# Patient Record
Sex: FEMALE | Race: BLACK OR AFRICAN AMERICAN | URBAN - METROPOLITAN AREA
[De-identification: names, ages, dates, MRNs, and addresses within clinical notes are randomized per-mention and may not be internally consistent; named-entity substitution may affect disease eponyms.]

---

## 2017-03-23 ENCOUNTER — OFFICE VISIT (OUTPATIENT)
Dept: SURGERY | Age: 52
End: 2017-03-23

## 2017-03-23 VITALS
SYSTOLIC BLOOD PRESSURE: 134 MMHG | HEART RATE: 96 BPM | TEMPERATURE: 99 F | RESPIRATION RATE: 16 BRPM | BODY MASS INDEX: 33.31 KG/M2 | OXYGEN SATURATION: 96 % | HEIGHT: 63 IN | DIASTOLIC BLOOD PRESSURE: 88 MMHG | WEIGHT: 188 LBS

## 2017-03-23 DIAGNOSIS — K64.2 THIRD DEGREE HEMORRHOIDS: ICD-10-CM

## 2017-03-23 DIAGNOSIS — K64.5 EXTERNAL HEMORRHOID, THROMBOSED: Primary | ICD-10-CM

## 2017-03-23 RX ORDER — ALBUTEROL SULFATE 90 UG/1
AEROSOL, METERED RESPIRATORY (INHALATION)
COMMUNITY

## 2017-03-23 RX ORDER — HYDROCODONE POLISTIREX AND CHLORPHENIRAMINE POLISTIREX 10; 8 MG/5ML; MG/5ML
SUSPENSION, EXTENDED RELEASE ORAL
COMMUNITY
End: 2017-03-23

## 2017-03-23 RX ORDER — METRONIDAZOLE 500 MG/1
TABLET ORAL 3 TIMES DAILY
COMMUNITY
End: 2017-03-23

## 2017-03-23 RX ORDER — LORAZEPAM 0.5 MG/1
TABLET ORAL
COMMUNITY

## 2017-03-23 RX ORDER — DOCUSATE SODIUM 100 MG/1
100 CAPSULE, LIQUID FILLED ORAL 2 TIMES DAILY
COMMUNITY

## 2017-03-23 RX ORDER — TAMOXIFEN CITRATE 20 MG/1
20 TABLET ORAL DAILY
COMMUNITY

## 2017-03-23 RX ORDER — RABEPRAZOLE SODIUM 20 MG/1
20 TABLET, DELAYED RELEASE ORAL DAILY
COMMUNITY

## 2017-03-23 RX ORDER — BENZONATATE 100 MG/1
100 CAPSULE ORAL
COMMUNITY
End: 2017-03-23

## 2017-03-23 RX ORDER — CLOBETASOL PROPIONATE 0.5 MG/G
CREAM TOPICAL 2 TIMES DAILY
COMMUNITY
End: 2017-03-23

## 2017-03-23 RX ORDER — VENLAFAXINE HYDROCHLORIDE 150 MG/1
TABLET, EXTENDED RELEASE ORAL
COMMUNITY

## 2017-03-23 RX ORDER — FERROUS SULFATE, DRIED 160(50) MG
1 TABLET, EXTENDED RELEASE ORAL 2 TIMES DAILY WITH MEALS
COMMUNITY

## 2017-03-23 RX ORDER — TERCONAZOLE 4 MG/G
1 CREAM VAGINAL
COMMUNITY
End: 2017-03-23

## 2017-03-23 RX ORDER — HYDROCHLOROTHIAZIDE 12.5 MG/1
12.5 CAPSULE ORAL DAILY
COMMUNITY

## 2017-03-23 RX ORDER — TERCONAZOLE 8 MG/G
1 CREAM VAGINAL
COMMUNITY
End: 2017-03-23

## 2017-03-23 NOTE — LETTER
3/23/2017 2:11 PM 
 
Patient: Alexus Grigsby YOB: 1965 Date of Visit: 3/23/2017 Janett Marquez MD 
[address] [place] [address] Dr Wright [address] [place] [id number] VIA Facsimile: [phone] Dear Alma Villa, I saw Yoel Laguerre in the office today for her anorectal pain. On exam she has a resolving right anterior quadrant thrombosed external hemorrhoid. She says at baseline she does not have significant hemorrhoidal symptoms. I recommended stool softener and sitz baths until the pain is fully resolved. I do not feel that excision of the hemorrhoid will benefit her at this point. She will follow-up if she does not have resolution of her symptoms. Thank you very much for your referral of Ms. Alexus Grigsby. If you have questions, please do not hesitate to call me. I look forward to following Ms. Ann along with you and will keep you updated as to her progress. Sincerely, Pawan Craft MD

## 2017-03-23 NOTE — PROGRESS NOTES
HPI: Benoit Wang is a 46 y.o. female presenting with chief complain of hemorrhoids. One week ago she had very painful hemorrhoid. She felt a firm mass. She has been using preparation H wipes, cream and suppository. At that point the pain was 10 out of 10 now it is 5. The pain is not constant but intermittent. She does have pain with bowel movements. She denies nausea vomiting bloating or abdominal pain. She has 2 bowel movements per day, and more recently has had some constipation. Her last colonoscopy was in 2016 and negative. She denies fecal incontinence. Past Medical History:   Diagnosis Date    Anxiety     Asthma     Breast cancer ([facility] [address]     Bronchitis     Carpal tunnel syndrome     Cough     GERD (gastroesophageal reflux disease)     Hypertension     Low back pain     Sinusitis     Vaginal yeast infection     Wheezing        Past Surgical History:   Procedure Laterality Date    HX APPENDECTOMY      HX BREAST RECONSTRUCTION      HX COLONOSCOPY  01/2016    normal    HX MASTECTOMY      HX OOPHORECTOMY      right       Family History   Problem Relation Age of Onset    Cancer Paternal Grandmother      stomach       Social History     Social History    Marital status: UNKNOWN     Spouse name: N/A    Number of children: N/A    Years of education: N/A     Social History Main Topics    Smoking status: Never Smoker    Smokeless tobacco: None    Alcohol use Yes    Drug use: None    Sexual activity: Not Asked     Other Topics Concern    None     Social History Narrative    None       Review of Systems - Review of Systems   Constitutional: Positive for chills, diaphoresis and fever. Negative for malaise/fatigue and weight loss. HENT: Negative. Eyes: Negative. Respiratory: Positive for cough, sputum production, shortness of breath and wheezing. Negative for hemoptysis. Cardiovascular: Negative. Gastrointestinal: Positive for constipation and nausea.  Negative for abdominal pain, blood in stool, diarrhea, heartburn, melena and vomiting. Genitourinary: Negative. Musculoskeletal: Positive for joint pain. Negative for back pain, falls, myalgias and neck pain. Knees   Skin: Negative. Neurological: Negative. Negative for weakness. Endo/Heme/Allergies: Negative for environmental allergies and polydipsia. Bruises/bleeds easily. Psychiatric/Behavioral: Negative. Outpatient Prescriptions Marked as Taking for the 3/23/17 encounter (Office Visit) with Mickey Sky MD   Medication Sig Dispense Refill    terconazole (TERAZOL 7) 0.4 % vaginal cream Insert 1 Applicator into vagina nightly.  Venlafaxine 150 mg tr24 Take  by mouth.  LORazepam (ATIVAN) 0.5 mg tablet Take  by mouth.  hydroCHLOROthiazide (MICROZIDE) 12.5 mg capsule Take 12.5 mg by mouth daily.  RABEprazole (ACIPHEX) 20 mg tablet Take 20 mg by mouth daily.  chlorpheniramine-HYDROcodone (TUSSIONEX PENNKINETIC ER) 10-8 mg/5 mL suspension Take  by mouth every twelve (12) hours as needed for Cough.  terconazole (TERAZOL 3) 0.8 % vaginal cream Insert 1 Applicator into vagina nightly.  benzonatate (TESSALON PERLES) 100 mg capsule Take 100 mg by mouth three (3) times daily as needed for Cough.  tamoxifen (NOLVADEX) 20 mg tablet Take 20 mg by mouth daily.  albuterol (PROAIR HFA) 90 mcg/actuation inhaler Take  by inhalation.  metroNIDAZOLE (FLAGYL) 500 mg tablet Take  by mouth three (3) times daily.  clobetasol (TEMOVATE) 0.05 % topical cream Apply  to affected area two (2) times a day.  docusate sodium (COLACE) 100 mg capsule Take 100 mg by mouth two (2) times a day.          Allergies   Allergen Reactions    Pcn [Penicillins] Rash    Sulfa (Sulfonamide Antibiotics) Rash       Vitals:    03/23/17 1319   BP: 134/88   Pulse: 96   Resp: 16   Temp: 99 °F (37.2 °C)   TempSrc: Oral   SpO2: 96%   Weight: 85.3 kg (188 lb)   Height: 5' 3\" (1.6 m) PainSc:   0 - No pain   LMP: 03/23/2012       Physical Exam   Constitutional: She appears well-developed and well-nourished. HENT:   Head: Normocephalic and atraumatic. Eyes: Conjunctivae and EOM are normal.   Abdominal: Soft. She exhibits no distension. There is no tenderness. Musculoskeletal: Normal range of motion. Lymphadenopathy:     She has no cervical adenopathy. Right: No inguinal adenopathy present. Left: No inguinal adenopathy present. Neurological: She exhibits normal muscle tone. Skin: Skin is warm and dry. No rash noted. Psychiatric: She has a normal mood and affect. Her speech is normal.    rectum: Right anterior quadrant partly thrombosed external hemorrhoid, slightly tender on exam  Right anterior and posterior hemorrhoidal prolapse  Digital rectal exam: Good tone, no masses  Anoscopy: Mild internal hemorrhoidal disease left lateral and right anterior quadrants    Assessment / Plan    Resolving thrombosed external hemorrhoid  Stool softeners and sitz baths for comfort, this will likely resolve on its own    Grade 3 hemorrhoids  Recommend high-fiber diet, fiber supplement with Metamucil or Citrucel  Patient states that aside from this episode she has no significant hemorrhoidal symptoms but if she does she can return to the office for consideration of further therapy        The diagnoses and plan were discussed with the patient. All questions answered. Plan of care agreed to by all concerned.

## 2017-03-23 NOTE — MR AVS SNAPSHOT
Visit Information Date & Time Provider Department Dept. Phone Encounter #  
 3/23/2017  1:15 PM Naveed Bonner  [address] [id number] Follow-up Instructions Return if symptoms worsen or fail to improve. Follow-up and Disposition History Upcoming Health Maintenance Date Due Hepatitis C Screening 1965 DTaP/Tdap/Td series (1 - Tdap) 11/21/1986 PAP AKA CERVICAL CYTOLOGY 11/21/1986 BREAST CANCER SCRN MAMMOGRAM 11/21/2015 FOBT Q 1 YEAR AGE 50-75 11/21/2015 INFLUENZA AGE 9 TO ADULT 8/1/2016 Allergies as of 3/23/2017  Review Complete On: 3/23/2017 By: Naveed Bonner MD  
  
 Severity Noted Reaction Type Reactions Pcn [Penicillins]  03/23/2017    Rash  
 Sulfa (Sulfonamide Antibiotics)  03/23/2017    Rash Current Immunizations  Never Reviewed No immunizations on file. Not reviewed this visit You Were Diagnosed With   
  
 Codes Comments External hemorrhoid, thrombosed    -  Primary ICD-10-CM: I17.0 ICD-9-CM: 455.4 Third degree hemorrhoids     ICD-10-CM: K64.2 ICD-9-CM: 455.8 Vitals BP Pulse Temp Resp Height(growth percentile) Weight(growth percentile) 134/88 (BP 1 Location: Left arm, BP Patient Position: Sitting) 96 99 °F (37.2 °C) (Oral) 16 5' 3\" (1.6 m) 188 lb (85.3 kg) LMP SpO2 BMI OB Status Smoking Status 03/23/2012 96% 33.3 kg/m2 Menopause Never Smoker Vitals History BMI and BSA Data Body Mass Index Body Surface Area  
 33.3 kg/m 2 1.95 m 2 Your Updated Medication List  
  
   
This list is accurate as of: 3/23/17  2:17 PM.  Always use your most recent med list.  
  
  
  
  
 ACIDOPHILUS Cap Generic drug:  Lactobacillus acidophilus Take 2 Caps by mouth two (2) times a day. calcium-vitamin D 500 mg(1,250mg) -200 unit per tablet Commonly known as:  OYSTER SHELL Take 1 Tab by mouth two (2) times daily (with meals). COLACE 100 mg capsule Generic drug:  docusate sodium Take 100 mg by mouth two (2) times a day. hydroCHLOROthiazide 12.5 mg capsule Commonly known as:  Ferdie Cea Take 12.5 mg by mouth daily. LORazepam 0.5 mg tablet Commonly known as:  ATIVAN Take  by mouth. ONE-A-DAY WOMEN VITACRAVES PO Take  by mouth. PROAIR HFA 90 mcg/actuation inhaler Generic drug:  albuterol Take  by inhalation. RABEprazole 20 mg tablet Commonly known as:  ACIPHEX Take 20 mg by mouth daily. tamoxifen 20 mg tablet Commonly known as:  NOLVADEX Take 20 mg by mouth daily. Venlafaxine 150 mg Tr24 Take  by mouth. We Performed the Following IN DIAGNOSTIC ANOSCOPY [id number] CPT(R)] Follow-up Instructions Return if symptoms worsen or fail to improve. Introducing [facility]! Reynaldo Hale introduces [employer] patient portal. Now you can access parts of your medical record, email your doctor's office, and request medication refills online. 1. In your internet browser, go to [email or web address] ([email or web address]). [email or web address] ([employer]) 2. Click on the First Time User? Click Here link in the Sign In box. You will see the New Member Sign Up page. 3. Enter your [employer] Access Code exactly as it appears below. You will not need to use this code after youve completed the sign-up process. If you do not sign up before the expiration date, you must request a new code. · [employer] Access Code: [email or web address] Expires: 6/21/2017  1:07 PM 
 
4. Enter the last four digits of your Social Security Number (xxxx) and Date of Birth (mm/dd/yyyy) as indicated and click Submit. You will be taken to the next sign-up page. 5. Create a [employer] ID. This will be your [employer] login ID and cannot be changed, so think of one that is secure and easy to remember. 6. Create a [employer] password. You can change your password at any time. 7. Enter your Password Reset Question and Answer. This can be used at a later time if you forget your password. 8. Enter your e-mail address. You will receive e-mail notification when new information is available in [address] 9. Click Sign Up. You can now view and download portions of your medical record. 10. Click the Download Summary menu link to download a portable copy of your medical information. If you have questions, please visit the Frequently Asked Questions section of the [employer] website. Remember, [employer] is NOT to be used for urgent needs. For medical emergencies, dial 911. Now available from your iPhone and Android! Please provide this summary of care documentation to your next provider. Your primary care clinician is listed as Darcy ARBOLEDA. If you have any questions after today's visit, please call [phone].